# Patient Record
Sex: MALE | Race: ASIAN | NOT HISPANIC OR LATINO | Employment: FULL TIME | URBAN - METROPOLITAN AREA
[De-identification: names, ages, dates, MRNs, and addresses within clinical notes are randomized per-mention and may not be internally consistent; named-entity substitution may affect disease eponyms.]

---

## 2017-07-12 ENCOUNTER — OFFICE VISIT (OUTPATIENT)
Dept: URGENT CARE | Facility: CLINIC | Age: 22
End: 2017-07-12
Payer: COMMERCIAL

## 2017-07-12 VITALS
HEART RATE: 78 BPM | RESPIRATION RATE: 16 BRPM | DIASTOLIC BLOOD PRESSURE: 78 MMHG | SYSTOLIC BLOOD PRESSURE: 118 MMHG | TEMPERATURE: 98.7 F | OXYGEN SATURATION: 98 %

## 2017-07-12 DIAGNOSIS — M79.89 SOFT TISSUE MASS: ICD-10-CM

## 2017-07-12 PROCEDURE — 99203 OFFICE O/P NEW LOW 30 MIN: CPT | Performed by: EMERGENCY MEDICINE

## 2017-07-12 ASSESSMENT — ENCOUNTER SYMPTOMS
SENSORY CHANGE: 0
SPEECH CHANGE: 0
SORE THROAT: 0
NAUSEA: 0
FEVER: 0
ABDOMINAL PAIN: 1
CHILLS: 0
VOMITING: 0
NECK PAIN: 0

## 2017-07-12 NOTE — MR AVS SNAPSHOT
Oracio Collazo Amos   2017 3:45 PM   Office Visit   MRN: [id number]    Department:  [facility]   Dept Phone:  [phone]    Description:  Male : 1995   Provider:  NATHALIE Malave M.D.           Reason for Visit     Otalgia Large bump Right ear X 1.5 weeks       Allergies as of 2017     No Known Allergies      You were diagnosed with     Soft tissue mass   [[id number]]         Vital Signs     Blood Pressure Pulse Temperature Respirations Oxygen Saturation       118/78 mmHg 78 37.1 °C (98.7 °F) 16 98%       Basic Information     Date Of Birth Sex Race Ethnicity Preferred Language    1995 Male  Non- English      Health Maintenance     Patient has no pending health maintenance at this time      Current Immunizations     No immunizations on file.      Below and/or attached are the medications your provider expects you to take. Review all of your home medications and newly ordered medications with your provider and/or pharmacist. Follow medication instructions as directed by your provider and/or pharmacist. Please keep your medication list with you and share with your provider. Update the information when medications are discontinued, doses are changed, or new medications (including over-the-counter products) are added; and carry medication information at all times in the event of emergency situations     Allergies:  No Known Allergies          Medications  Valid as of: 2017 -  4:21 PM    Generic Name Brand Name Tablet Size Instructions for use    .                 Medicines prescribed today were sent to:     [id number]  [place] - [address] AT [facility] [address]    [address] [place] [address]    Phone: [phone] Fax: [phone]    Open 24 Hours?: No      Medication refill instructions:       If your prescription bottle indicates you have medication refills left, it is not necessary to call your provider’s office.  Please contact your pharmacy and they will refill your medication.    If your prescription bottle indicates you do not have any refills left, you may request refills at any time through one of the following ways: The online Provender system (except Urgent Care), by calling your provider’s office, or by asking your pharmacy to contact your provider’s office with a refill request. Medication refills are processed only during regular business hours and may not be available until the next business day. Your provider may request additional information or to have a follow-up visit with you prior to refilling your medication.   *Please Note: Medication refills are assigned a new Rx number when refilled electronically. Your pharmacy may indicate that no refills were authorized even though a new prescription for the same medication is available at the pharmacy. Please request the medicine by name with the pharmacy before contacting your provider for a refill.           [email or web address] Status: Patient Declined

## 2017-07-12 NOTE — PROGRESS NOTES
Subjective:      Oracio Trinidad is a 21 y.o. male who presents with Otalgia            Otalgia   There is pain in the right ear. Chronicity: for the past 1-1/2 weeks of patient had a flat mass localizes to his posterior mid pinna. Patient gives no history of trauma. The current episode started 1 to 4 weeks ago. The problem occurs constantly. The problem has been unchanged. There has been no fever. The pain is mild. Associated symptoms include abdominal pain and hearing loss. Pertinent negatives include no ear discharge, neck pain, rash, sore throat or vomiting.     No Known Allergies   Social History     Social History   • Marital Status: Single     Spouse Name: N/A   • Number of Children: N/A   • Years of Education: N/A     Occupational History   • Not on file.     Social History Main Topics   • Smoking status: Not on file   • Smokeless tobacco: Not on file   • Alcohol Use: Not on file   • Drug Use: Not on file   • Sexual Activity: Not on file     Other Topics Concern   • Not on file     Social History Narrative   • No narrative on file   History reviewed. No pertinent past medical history.   No current outpatient prescriptions on file prior to visit.     No current facility-administered medications on file prior to visit.     Review of Systems   Constitutional: Negative for fever and chills.   HENT: Positive for ear pain and hearing loss. Negative for ear discharge, nosebleeds and sore throat.    Cardiovascular: Negative for chest pain.   Gastrointestinal: Positive for abdominal pain. Negative for nausea and vomiting.   Musculoskeletal: Negative for neck pain.   Skin: Negative for rash.   Neurological: Negative for sensory change and speech change.          Objective:     /78 mmHg  Pulse 78  Temp(Src) 37.1 °C (98.7 °F)  Resp 16  SpO2 98%     Physical Exam   Constitutional: He appears well-developed and well-nourished. No distress.   HENT:   Head: Normocephalic and atraumatic.   Right Ear: External ear  normal.   Left Ear: External ear normal.   Ears:    Patient has a 3 x 5 mm soft tissue mass posterior to his pinna see the diagram the lesion is not warm not read minimal if any tenderness. Soft to palpation no fluctuance.   Eyes: Left eye exhibits no discharge.   Cardiovascular: Normal rate.    Skin: He is not diaphoretic.   Vitals reviewed.              Assessment/Plan:     Diagnosis right pinna mass.    I do not know whether this is a lipoma, soft hematoma. At this stage does not appear to be an abscess although it may develop into one. Patient is going to wait one week return and be reevaluate

## 2017-08-22 ENCOUNTER — OFFICE VISIT (OUTPATIENT)
Dept: URGENT CARE | Facility: CLINIC | Age: 22
End: 2017-08-22
Payer: COMMERCIAL

## 2017-08-22 VITALS
DIASTOLIC BLOOD PRESSURE: 60 MMHG | HEIGHT: 70 IN | HEART RATE: 74 BPM | BODY MASS INDEX: 24.34 KG/M2 | RESPIRATION RATE: 16 BRPM | WEIGHT: 170 LBS | SYSTOLIC BLOOD PRESSURE: 112 MMHG | TEMPERATURE: 98.4 F | OXYGEN SATURATION: 96 %

## 2017-08-22 DIAGNOSIS — B36.0 TINEA VERSICOLOR: ICD-10-CM

## 2017-08-22 DIAGNOSIS — R23.4 PEELING SKIN: ICD-10-CM

## 2017-08-22 PROCEDURE — 99213 OFFICE O/P EST LOW 20 MIN: CPT | Performed by: PHYSICIAN ASSISTANT

## 2017-08-22 ASSESSMENT — ENCOUNTER SYMPTOMS
ABDOMINAL PAIN: 0
MYALGIAS: 0
HEADACHES: 0
DIZZINESS: 0
COUGH: 0
NAUSEA: 0
PALPITATIONS: 0
FEVER: 0
CHILLS: 0
VOMITING: 0

## 2017-08-22 NOTE — PATIENT INSTRUCTIONS
Tinea Versicolor  Tinea versicolor is a common fungal infection of the skin. It causes a rash that appears as light or dark patches on the skin. The rash most often occurs on the chest, back, neck, or upper arms. This condition is more common during warm weather.  Other than affecting how your skin looks, tinea versicolor usually does not cause other problems. In most cases, the infection goes away in a few weeks with treatment. It may take a few months for the patches on your skin to clear up.  CAUSES  Tinea versicolor occurs when a type of fungus that is normally present on the skin starts to overgrow. This fungus is a kind of yeast. The exact cause of the overgrowth is not known. This condition cannot be passed from one person to another (noncontagious).  RISK FACTORS  This condition is more likely to develop when certain factors are present, such as:  · Heat and humidity.  · Sweating too much.  · Hormone changes.  · Oily skin.  · A weak defense (immune) system.  SYMPTOMS  Symptoms of this condition may include:  · A rash on your skin that is made up of light or dark patches. The rash may have:  ¨ Patches of tan or pink spots on light skin.  ¨ Patches of white or brown spots on dark skin.  ¨ Patches of skin that do not tan.  ¨ Well-marked edges.  ¨ Scales on the discolored areas.  · Mild itching.  DIAGNOSIS  A health care provider can usually diagnose this condition by looking at your skin. During the exam, he or she may use ultraviolet light to help determine the extent of the infection. In some cases, a skin sample may be taken by scraping the rash. This sample will be viewed under a microscope to check for yeast overgrowth.  TREATMENT  Treatment for this condition may include:  · Dandruff shampoo that is applied to the affected skin during showers or bathing.  · Over-the-counter medicated skin cream, lotion, or soaps.  · Prescription antifungal medicine in the form of skin cream or pills.  · Medicine to help  reduce itching.  HOME CARE INSTRUCTIONS  · Take medicines only as directed by your health care provider.  · Apply dandruff shampoo to the affected area if told to do so by your health care provider. You may be instructed to scrub the affected skin for several minutes each day.  · Do not scratch the affected area of skin.  · Avoid hot and humid conditions.  · Do not use tanning booths.  · Try to avoid sweating a lot.  SEEK MEDICAL CARE IF:  · Your symptoms get worse.  · You have a fever.  · You have redness, swelling, or pain at the site of your rash.  · You have fluid, blood, or pus coming from your rash.  · Your rash returns after treatment.     This information is not intended to replace advice given to you by your health care provider. Make sure you discuss any questions you have with your health care provider.     Document Released: 12/15/2001 Document Revised: 01/08/2016 Document Reviewed: 09/29/2015  [employer] Interactive Patient Education ©2016 [employer]

## 2017-08-22 NOTE — MR AVS SNAPSHOT
"        Oracio Collazo Amos   2017 2:30 PM   Office Visit   MRN: [id number]    Department:  [facility]   Dept Phone:  [phone]    Description:  Male : 1995   Provider:  Moira Krueger PA-C           Reason for Visit     Other went on vacation and when he came back noticed theres spots on his skin and peeling and is concern      Allergies as of 2017     No Known Allergies      Vital Signs     Blood Pressure Pulse Temperature Respirations Height Weight    112/60 mmHg 74 36.9 °C (98.4 °F) 16 1.778 m (5' 10\") 77.111 kg (170 lb)    Body Mass Index Oxygen Saturation                24.39 kg/m2 96%          Basic Information     Date Of Birth Sex Race Ethnicity Preferred Language    1995 Male  Non- English      Health Maintenance        Date Due Completion Dates    IMM HEP B VACCINE (1 of 3 - Primary Series) 1995 ---    IMM HEP A VACCINE (1 of 2 - Standard Series) 1996 ---    IMM HPV VACCINE (1 of 3 - Male 3 Dose Series) 2006 ---    IMM VARICELLA (CHICKENPOX) VACCINE (1 of 2 - 2 Dose Adolescent Series) 2008 ---    IMM MENINGOCOCCAL VACCINE (MCV4) (1 of 1) 2011 ---    IMM DTaP/Tdap/Td Vaccine (1 - Tdap) 2014 ---    IMM INFLUENZA (1) 2017 ---            Current Immunizations     No immunizations on file.      Below and/or attached are the medications your provider expects you to take. Review all of your home medications and newly ordered medications with your provider and/or pharmacist. Follow medication instructions as directed by your provider and/or pharmacist. Please keep your medication list with you and share with your provider. Update the information when medications are discontinued, doses are changed, or new medications (including over-the-counter products) are added; and carry medication information at all times in the event of emergency situations     Allergies:  No Known Allergies          Medications  Valid as of: " 2017 -  2:50 PM    Generic Name Brand Name Tablet Size Instructions for use    .                 Medicines prescribed today were sent to:     "[facility] [id number] [place] - [address] [place]    [address] [place] [address]    Phone: [phone] Fax: [phone]    Open 24 Hours?: Yes      Medication refill instructions:       If your prescription bottle indicates you have medication refills left, it is not necessary to call your provider’s office. Please contact your pharmacy and they will refill your medication.    If your prescription bottle indicates you do not have any refills left, you may request refills at any time through one of the following ways: The online [employer] system (except Urgent Care), by calling your provider’s office, or by asking your pharmacy to contact your provider’s office with a refill request. Medication refills are processed only during regular business hours and may not be available until the next business day. Your provider may request additional information or to have a follow-up visit with you prior to refilling your medication.   *Please Note: Medication refills are assigned a new Rx number when refilled electronically. Your pharmacy may indicate that no refills were authorized even though a new prescription for the same medication is available at the pharmacy. Please request the medicine by name with the pharmacy before contacting your provider for a refill.        Instructions    Tinea Versicolor  Tinea versicolor is a common fungal infection of the skin. It causes a rash that appears as light or dark patches on the skin. The rash most often occurs on the chest, back, neck, or upper arms. This condition is more common during warm weather.  Other than affecting how your skin looks, tinea versicolor usually does not cause other problems. In most cases, the infection goes away in a few weeks with treatment. It may take a few months for the  patches on your skin to clear up.  CAUSES  Tinea versicolor occurs when a type of fungus that is normally present on the skin starts to overgrow. This fungus is a kind of yeast. The exact cause of the overgrowth is not known. This condition cannot be passed from one person to another (noncontagious).  RISK FACTORS  This condition is more likely to develop when certain factors are present, such as:  · Heat and humidity.  · Sweating too much.  · Hormone changes.  · Oily skin.  · A weak defense (immune) system.  SYMPTOMS  Symptoms of this condition may include:  · A rash on your skin that is made up of light or dark patches. The rash may have:  ¨ Patches of tan or pink spots on light skin.  ¨ Patches of white or brown spots on dark skin.  ¨ Patches of skin that do not tan.  ¨ Well-marked edges.  ¨ Scales on the discolored areas.  · Mild itching.  DIAGNOSIS  A health care provider can usually diagnose this condition by looking at your skin. During the exam, he or she may use ultraviolet light to help determine the extent of the infection. In some cases, a skin sample may be taken by scraping the rash. This sample will be viewed under a microscope to check for yeast overgrowth.  TREATMENT  Treatment for this condition may include:  · Dandruff shampoo that is applied to the affected skin during showers or bathing.  · Over-the-counter medicated skin cream, lotion, or soaps.  · Prescription antifungal medicine in the form of skin cream or pills.  · Medicine to help reduce itching.  HOME CARE INSTRUCTIONS  · Take medicines only as directed by your health care provider.  · Apply dandruff shampoo to the affected area if told to do so by your health care provider. You may be instructed to scrub the affected skin for several minutes each day.  · Do not scratch the affected area of skin.  · Avoid hot and humid conditions.  · Do not use tanning booths.  · Try to avoid sweating a lot.  SEEK MEDICAL CARE IF:  · Your symptoms get  worse.  · You have a fever.  · You have redness, swelling, or pain at the site of your rash.  · You have fluid, blood, or pus coming from your rash.  · Your rash returns after treatment.     This information is not intended to replace advice given to you by your health care provider. Make sure you discuss any questions you have with your health care provider.     Document Released: 12/15/2001 Document Revised: 01/08/2016 Document Reviewed: 09/29/2015  [employer] Interactive Patient Education ©2016 [employer]            MyChart Status: Patient Declined

## 2017-08-22 NOTE — PROGRESS NOTES
"Subjective:      Oracio Trinidad is a 21 y.o. male who presents with Other    Current medications reviewed.  No past medical history on file.  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  He also has peeling skin down arms, across chest and back.       Other  Pertinent negatives include no abdominal pain, chest pain, chills, coughing, fever, headaches, myalgias, nausea or vomiting.       Review of Systems   Constitutional: Negative for fever and chills.   Respiratory: Negative for cough.    Cardiovascular: Negative for chest pain and palpitations.   Gastrointestinal: Negative for nausea, vomiting and abdominal pain.   Musculoskeletal: Negative for myalgias and joint pain.   Skin:        Peeling dermis   Neurological: Negative for dizziness and headaches.   All other systems reviewed and are negative.          Objective:     /60 mmHg  Pulse 74  Temp(Src) 36.9 °C (98.4 °F)  Resp 16  Ht 1.778 m (5' 10\")  Wt 77.111 kg (170 lb)  BMI 24.39 kg/m2  SpO2 96%     Physical Exam   Constitutional: He is oriented to person, place, and time. He appears well-developed and well-nourished.   Eyes: EOM are normal. Pupils are equal, round, and reactive to light.   Cardiovascular: Normal rate and normal heart sounds.    Pulmonary/Chest: Effort normal and breath sounds normal.   Neurological: He is alert and oriented to person, place, and time.   Skin: Skin is warm and dry.        Sunburned/sun soaked skin with moderate peeling of top layers of dermis, aggressive shedding.  < 1 cm white circular patches of dermis, non raised, on upper chest, posterior neck and upper back.  There are no lacerations, erythema, seepage, ecchymosis, warmth, edema or " inflammation of dermis.     Nursing note and vitals reviewed.              Assessment/Plan:     1. Tinea versicolor     2. Peeling skin       Sun soaked peeling skin with mild tinea on chest.  Recs for selsun blue shampoo to be used on wash cloth to clean skin over the next month.  Reassurance skin in not infected just peeling for heavy sun.  Patient reports understanding and agrees with plan.